# Patient Record
Sex: MALE | URBAN - METROPOLITAN AREA
[De-identification: names, ages, dates, MRNs, and addresses within clinical notes are randomized per-mention and may not be internally consistent; named-entity substitution may affect disease eponyms.]

---

## 2024-01-16 ENCOUNTER — OFFICE VISIT (OUTPATIENT)
Dept: URBAN - METROPOLITAN AREA CLINIC 17 | Facility: CLINIC | Age: 54
End: 2024-01-16
Payer: OTHER GOVERNMENT

## 2024-01-16 ENCOUNTER — DASHBOARD ENCOUNTERS (OUTPATIENT)
Age: 54
End: 2024-01-16

## 2024-01-16 VITALS
BODY MASS INDEX: 36.37 KG/M2 | WEIGHT: 240 LBS | SYSTOLIC BLOOD PRESSURE: 154 MMHG | HEIGHT: 68 IN | TEMPERATURE: 97 F | HEART RATE: 74 BPM | DIASTOLIC BLOOD PRESSURE: 88 MMHG

## 2024-01-16 DIAGNOSIS — K21.9 ACID REFLUX: ICD-10-CM

## 2024-01-16 DIAGNOSIS — R19.4 ALTERATION IN BOWEL ELIMINATION: ICD-10-CM

## 2024-01-16 DIAGNOSIS — R11.2 NAUSEA & VOMITING: ICD-10-CM

## 2024-01-16 DIAGNOSIS — Z12.11 SCREENING FOR COLON CANCER: ICD-10-CM

## 2024-01-16 PROBLEM — 161466001: Status: ACTIVE | Noted: 2024-01-16

## 2024-01-16 PROBLEM — 55822004: Status: ACTIVE | Noted: 2024-01-16

## 2024-01-16 PROBLEM — 44054006: Status: ACTIVE | Noted: 2024-01-16

## 2024-01-16 PROBLEM — 309298003: Status: ACTIVE | Noted: 2024-01-16

## 2024-01-16 PROBLEM — 59621000: Status: ACTIVE | Noted: 2024-01-16

## 2024-01-16 PROBLEM — 235595009: Status: ACTIVE | Noted: 2024-01-16

## 2024-01-16 PROCEDURE — 99204 OFFICE O/P NEW MOD 45 MIN: CPT | Performed by: INTERNAL MEDICINE

## 2024-01-16 PROCEDURE — 99244 OFF/OP CNSLTJ NEW/EST MOD 40: CPT | Performed by: INTERNAL MEDICINE

## 2024-01-16 RX ORDER — METFORMIN HYDROCHLORIDE 500 MG/1
1 TABLET WITH A MEAL TABLET, FILM COATED ORAL ONCE A DAY
Status: ACTIVE | COMMUNITY

## 2024-01-16 RX ORDER — CHLORTHALIDONE 25 MG/1
1 TABLET IN THE MORNING WITH FOOD TABLET ORAL
Status: ACTIVE | COMMUNITY

## 2024-01-16 RX ORDER — AMLODIPINE BESYLATE 10 MG/1
TABLET ORAL
Qty: 90 TABLET | Status: ACTIVE | COMMUNITY

## 2024-01-16 RX ORDER — GABAPENTIN 100 MG/1
1 CAPSULE CAPSULE ORAL ONCE A DAY
Status: ACTIVE | COMMUNITY

## 2024-01-16 RX ORDER — LINACLOTIDE 145 UG/1
1 CAPSULE AT LEAST 30 MINUTES BEFORE THE FIRST MEAL OF THE DAY ON AN EMPTY STOMACH CAPSULE, GELATIN COATED ORAL ONCE A DAY
Qty: 30 | Refills: 1 | OUTPATIENT
Start: 2024-01-16 | End: 2024-03-16

## 2024-01-16 RX ORDER — OMEPRAZOLE 40 MG/1
1 CAPSULE 30 MINUTES BEFORE MORNING MEAL CAPSULE, DELAYED RELEASE ORAL ONCE A DAY
Qty: 90 | Refills: 0 | OUTPATIENT
Start: 2024-01-16

## 2024-01-16 RX ORDER — CLOPIDOGREL BISULFATE 75 MG/1
TABLET, FILM COATED ORAL
Qty: 30 TABLET | Status: ACTIVE | COMMUNITY

## 2024-01-16 RX ORDER — ALLOPURINOL 300 MG/1
TABLET ORAL
Qty: 90 TABLET | Status: ACTIVE | COMMUNITY

## 2024-01-16 RX ORDER — HYDRALAZINE HYDROCHLORIDE 100 MG/1
TABLET ORAL
Qty: 270 TABLET | Status: ACTIVE | COMMUNITY

## 2024-01-16 RX ORDER — ATORVASTATIN CALCIUM 40 MG/1
TABLET ORAL
Qty: 90 EACH | Refills: 0 | Status: ACTIVE | COMMUNITY

## 2024-01-16 NOTE — HPI-TODAY'S VISIT:
New patient 53 yo male with PMH of HTN, DM2 (with 2 instances of hypoglycemia), HLD, and CVA (05/2023 and 12/2023) [facility].The patient presents for a colon cancer screening. There is no family history of colon polyps or colon cancer. Patient admits to  change in bowel habits. Denies changes in appetite and weight. Patient denies rectal bleeding. Patient denies any cardiac, lung, or kidney problems. Patient denies any digestive symptoms currently. Last colonoscopy: never had one before   Diarrhea/constipation cycling for years. When he is constipated, he can go about 2 weeks without a BM. Admits to straining and incomplete evacuation. Unable to tell if he has rectal bleeding because patient is color-blind. He has not tried any medications for it. He states he drinks plenty of water daily. When he has diarrhea will have 5 loose to watery stools a day. Not postprandial. He will have associated abdominal pain that is sometimes relieved with BMs.  Has hx of GERD for the past 20 years. Symptoms include HB, regurgitation, n/v and epigastric pain. He used to take OTC antacids, but they did not work well.  Denies dysphagia. Admits to NSAID use.   He reports having 2 strokes last year, once in May and once in November. He went to [facility]. He is currently on AC with Plavix daily. He was referred to see a neurologist for brain imaging but has not been contacted for an appointment yet.

## 2024-01-17 LAB
ABSOLUTE BASOPHILS: 52
ABSOLUTE EOSINOPHILS: 207
ABSOLUTE LYMPHOCYTES: 1606
ABSOLUTE MONOCYTES: 570
ABSOLUTE NEUTROPHILS: 4965
BASOPHILS: 0.7
EOSINOPHILS: 2.8
FERRITIN, SERUM: 374
H PYLORI BREATH TEST: NOT DETECTED
HEMATOCRIT: 37.2
HEMOGLOBIN: 13
IRON BIND.CAP.(TIBC): 298
IRON SATURATION: 27
IRON: 80
LYMPHOCYTES: 21.7
MCH: 31.9
MCHC: 34.9
MCV: 91.2
MONOCYTES: 7.7
MPV: 11.1
NEUTROPHILS: 67.1
PLATELET COUNT: 209
RDW: 13.5
RED BLOOD CELL COUNT: 4.08
WHITE BLOOD CELL COUNT: 7.4